# Patient Record
Sex: FEMALE | Race: WHITE | NOT HISPANIC OR LATINO | URBAN - NONMETROPOLITAN AREA
[De-identification: names, ages, dates, MRNs, and addresses within clinical notes are randomized per-mention and may not be internally consistent; named-entity substitution may affect disease eponyms.]

---

## 2018-10-25 PROBLEM — H40.1131: Noted: 2018-10-25

## 2019-10-21 ENCOUNTER — IMPORTED ENCOUNTER (OUTPATIENT)
Dept: URBAN - NONMETROPOLITAN AREA CLINIC 1 | Facility: CLINIC | Age: 79
End: 2019-10-21

## 2019-10-21 PROCEDURE — 92012 INTRM OPH EXAM EST PATIENT: CPT

## 2019-10-21 NOTE — PATIENT DISCUSSION
Alphagan allergy COAG-Recent released by dr. Stacy Hannon at [facility] stable. -Continue Lumigan and Cosopt as directed. Stressed importance of compliance. - Sent refill to [facility] today as pt requested-RTC 3mo TA check OCT ONH & Pach; Dr's Notes: PCP Dr. Saeid Smith in [facility] Pt. Pt wants 80 day Giles Jackson

## 2020-03-21 NOTE — PROCEDURE NOTE: CLINICAL
PROCEDURE NOTE: Avastin 1.25mg #1 OD. Diagnosis: Diabetic Macular Edema. Prior to injection, risks/benefits/alternatives discussed including infection, loss of vision, hemorrhage, cataract, glaucoma, retinal tears or detachment. The off-label status of Intravitreal Avastin also was reviewed. The patient wished to proceed with treatment. Topical anesthesia was induced with Alcaine. Additional anesthesia was achieved using drop(s) or injection checked above. A drop of Povidone-iodine 5% ophthalmic solution was instilled over the injection site and in the inferior fornix. Betadine prep was performed. Using the syringe provided, Avastin 1.25 mg in 0.05 cc was injected into the vitreous cavity. The needle was passed 3.0 mm posterior to the limbus in pseudophakic patients, and 3.5 mm posterior to the limbus in phakic patients. Patient tolerated procedure well. There were no complications. Injection time: 952. Lot #: [email or web address]. Expiration Date: 9788-01-07G33:00:00. Post-op instructions given. Inventory Id: null. The patient was instructed to return for re-evaluation in approximately 4-12 weeks depending on his/her condition and was told to call immediately if vision decreases and/or if his/her eye becomes red, painful, and/or light sensitive. The patient was instructed to go to the emergency room or call 911 if unable to reach the doctor within an hour or two of trying or calling. The patient was instructed to use Artificial Tears q.i.d. p.r.n for comfort. Crista Rico PROCEDURE NOTE: Avastin 1.25mg #1 OS. Diagnosis: Diabetic Macular Edema. Prior to injection, risks/benefits/alternatives discussed including infection, loss of vision, hemorrhage, cataract, glaucoma, retinal tears or detachment. The off-label status of Intravitreal Avastin also was reviewed. The patient wished to proceed with treatment. Topical anesthesia was induced with Alcaine. Additional anesthesia was achieved using drop(s) or injection checked above. A drop of Povidone-iodine 5% ophthalmic solution was instilled over the injection site and in the inferior fornix. Betadine prep was performed. Using the syringe provided, Avastin 1.25 mg in 0.05 cc was injected into the vitreous cavity. The needle was passed 3.0 mm posterior to the limbus in pseudophakic patients, and 3.5 mm posterior to the limbus in phakic patients. Patient tolerated procedure well. There were no complications. Injection time: 951. Lot #: [email or web address]. Expiration Date: 8895-55-51X64:00:00. Post-op instructions given. Inventory Id: null. The patient was instructed to return for re-evaluation in approximately 4-12 weeks depending on his/her condition and was told to call immediately if vision decreases and/or if his/her eye becomes red, painful, and/or light sensitive. The patient was instructed to go to the emergency room or call 911 if unable to reach the doctor within an hour or two of trying or calling. The patient was instructed to use Artificial Tears q.i.d. p.r.n for comfort. Crista Rico

## 2020-04-10 ENCOUNTER — IMPORTED ENCOUNTER (OUTPATIENT)
Dept: URBAN - NONMETROPOLITAN AREA CLINIC 1 | Facility: CLINIC | Age: 80
End: 2020-04-10

## 2020-04-10 PROCEDURE — 92133 CPTRZD OPH DX IMG PST SGM ON: CPT

## 2020-04-10 PROCEDURE — 99213 OFFICE O/P EST LOW 20 MIN: CPT

## 2020-04-10 NOTE — PATIENT DISCUSSION
COAG OU-  Recent released by dr. Batsheva Wolff at [facility]-  discussed findings w/patient today-  IOPs are stable at 22 17-  c/d's .3/.3 and .5/.5-  continue Cosopt BID OU refills sent today-  continue Lumigan QHS OU refills sent today-  OCT ON done today. OD: normal RNFL 84um 9/10 SS; OS: 79um 6/10 SS mild superior thinning all other quadrants normal -  RTC 4 mo f/u w/Gonio and Pach; Dr's Notes:  Allergy to AlphaganPCP Dr. Clifton Cortes in [address] [place] [address] 4/10/2020Pach

## 2020-04-24 NOTE — PROCEDURE NOTE: CLINICAL
PROCEDURE NOTE: Avastin 1.25mg #2 OD. Diagnosis: Diabetic Macular Edema. Anesthesia: Lidocaine 4%. Prep: Betadine Drops. Prior to injection, risks/benefits/alternatives discussed including infection, loss of vision, hemorrhage, cataract, glaucoma, retinal tears or detachment. The off-label status of Intravitreal Avastin also was reviewed. The patient wished to proceed with treatment. Topical anesthesia was induced with Alcaine. Additional anesthesia was achieved using drop(s) or injection checked above. A drop of Povidone-iodine 5% ophthalmic solution was instilled over the injection site and in the inferior fornix. Betadine prep was performed. Using the syringe provided, Avastin 1.25 mg in 0.05 cc was injected into the vitreous cavity. The needle was passed 3.0 mm posterior to the limbus in pseudophakic patients, and 3.5 mm posterior to the limbus in phakic patients. Patient tolerated procedure well. There were no complications. Injection time: 233PM. Lot #: [email or web address]. Expiration Date: 7/15/2020. Post-op instructions given. Inventory Id: null. The patient was instructed to return for re-evaluation in approximately 4-12 weeks depending on his/her condition and was told to call immediately if vision decreases and/or if his/her eye becomes red, painful, and/or light sensitive. The patient was instructed to go to the emergency room or call 911 if unable to reach the doctor within an hour or two of trying or calling. The patient was instructed to use Artificial Tears q.i.d. p.r.n for comfort. Queen Solomon PROCEDURE NOTE: Avastin 1.25mg #2 OS. Diagnosis: Diabetic Macular Edema. Anesthesia: Lidocaine 4%. Prep: Betadine Drops. Prior to injection, risks/benefits/alternatives discussed including infection, loss of vision, hemorrhage, cataract, glaucoma, retinal tears or detachment. The off-label status of Intravitreal Avastin also was reviewed. The patient wished to proceed with treatment. Topical anesthesia was induced with Alcaine. Additional anesthesia was achieved using drop(s) or injection checked above. A drop of Povidone-iodine 5% ophthalmic solution was instilled over the injection site and in the inferior fornix. Betadine prep was performed. Using the syringe provided, Avastin 1.25 mg in 0.05 cc was injected into the vitreous cavity. The needle was passed 3.0 mm posterior to the limbus in pseudophakic patients, and 3.5 mm posterior to the limbus in phakic patients. Patient tolerated procedure well. There were no complications. Injection time: 232PM. Lot #: La [email or web address]. Expiration Date: 7/21/2020. Post-op instructions given. Inventory Id: null. The patient was instructed to return for re-evaluation in approximately 4-12 weeks depending on his/her condition and was told to call immediately if vision decreases and/or if his/her eye becomes red, painful, and/or light sensitive. The patient was instructed to go to the emergency room or call 911 if unable to reach the doctor within an hour or two of trying or calling. The patient was instructed to use Artificial Tears q.i.d. p.r.n for comfort. [facility]

## 2020-06-05 NOTE — PROCEDURE NOTE: CLINICAL
PROCEDURE NOTE: Avastin 1.25mg OD. Diagnosis: Diabetic Macular Edema. Anesthesia: Topical. Prep: Betadine Drops. Prior to injection, risks/benefits/alternatives discussed including infection, loss of vision, hemorrhage, cataract, glaucoma, retinal tears or detachment. The off-label status of Intravitreal Avastin also was reviewed. The patient wished to proceed with treatment. Topical anesthesia was induced with Alcaine. Additional anesthesia was achieved using drop(s) or injection checked above. A drop of Povidone-iodine 5% ophthalmic solution was instilled over the injection site and in the inferior fornix. Betadine prep was performed. Using the syringe provided, Avastin 1.25 mg in 0.05 cc was injected into the vitreous cavity. The needle was passed 3.0 mm posterior to the limbus in pseudophakic patients, and 3.5 mm posterior to the limbus in phakic patients. Patient tolerated procedure well. There were no complications. Injection time: 242PM. Lot #: [email or web address]. Expiration Date: 7/24/2020. Post-op instructions given. Inventory Id: null. The patient was instructed to return for re-evaluation in approximately 4-12 weeks depending on his/her condition and was told to call immediately if vision decreases and/or if his/her eye becomes red, painful, and/or light sensitive. The patient was instructed to go to the emergency room or call 911 if unable to reach the doctor within an hour or two of trying or calling. The patient was instructed to use Artificial Tears q.i.d. p.r.n for comfort. Christos Henry PROCEDURE NOTE: Avastin 1.25mg OS. Diagnosis: Diabetic Macular Edema. Anesthesia: Lidocaine 4%. Prep: Betadine Drops. Prior to injection, risks/benefits/alternatives discussed including infection, loss of vision, hemorrhage, cataract, glaucoma, retinal tears or detachment. The off-label status of Intravitreal Avastin also was reviewed. The patient wished to proceed with treatment. Topical anesthesia was induced with Alcaine. Additional anesthesia was achieved using drop(s) or injection checked above. A drop of Povidone-iodine 5% ophthalmic solution was instilled over the injection site and in the inferior fornix. Betadine prep was performed. Using the syringe provided, Avastin 1.25 mg in 0.05 cc was injected into the vitreous cavity. The needle was passed 3.0 mm posterior to the limbus in pseudophakic patients, and 3.5 mm posterior to the limbus in phakic patients. Patient tolerated procedure well. There were no complications. Injection time: 241PM. Lot #: [email or web address]. Expiration Date: 7/30/2020. Post-op instructions given. Inventory Id: null. The patient was instructed to return for re-evaluation in approximately 4-12 weeks depending on his/her condition and was told to call immediately if vision decreases and/or if his/her eye becomes red, painful, and/or light sensitive. The patient was instructed to go to the emergency room or call 911 if unable to reach the doctor within an hour or two of trying or calling. The patient was instructed to use Artificial Tears q.i.d. p.r.n for comfort. Christos Henyr

## 2020-07-02 ENCOUNTER — IMPORTED ENCOUNTER (OUTPATIENT)
Dept: URBAN - NONMETROPOLITAN AREA CLINIC 1 | Facility: CLINIC | Age: 80
End: 2020-07-02

## 2020-07-02 PROBLEM — H02.88A: Noted: 2020-07-02

## 2020-07-02 PROBLEM — H02.88B: Noted: 2020-07-02

## 2020-07-02 PROBLEM — H26.493: Noted: 2020-07-02

## 2020-07-02 PROBLEM — H40.1131: Noted: 2020-07-02

## 2020-07-02 PROBLEM — H01.021: Noted: 2020-07-02

## 2020-07-02 PROBLEM — H00.021: Noted: 2020-07-02

## 2020-07-02 PROBLEM — H01.024: Noted: 2020-07-02

## 2020-07-02 PROCEDURE — 92012 INTRM OPH EXAM EST PATIENT: CPT

## 2020-07-02 NOTE — PATIENT DISCUSSION
Hordeolum RUL/MGD/Bleph -  discussed findings w/patient-  educated patient this is an inflammitory repsonse to the debris and bateria build up on the lids and lashe-  start hot compresses at least BID-TID w/ocular massage. Discussed Kenyon Mask with patient -  start Tobradex QID OD x 7 days Rx sent-  will have patient start Mak Valdez when samples arrive-  monitor 1 week f/u or prn; Dr's Notes:  Allergy to AlphaganPCP Dr. Hallie Higgins in [address] [place] [address] 4/10/2020Pach

## 2020-07-08 ENCOUNTER — IMPORTED ENCOUNTER (OUTPATIENT)
Dept: URBAN - NONMETROPOLITAN AREA CLINIC 1 | Facility: CLINIC | Age: 80
End: 2020-07-08

## 2020-07-08 PROCEDURE — 99213 OFFICE O/P EST LOW 20 MIN: CPT

## 2020-07-08 NOTE — PATIENT DISCUSSION
Marcia TAPIA (resolving) /MGD/Bleph -  discussed findings w/patient-  continue  hot compresses at least BID-TID w/ocular massage.-Patient states she has purchased and uses Kenyon Mask- Patient to discontinue use of Tobradex - monitor prn- RTC 4 mo f/u as scheduled; Dr's Notes:  Allergy to AlphaganPCP Dr. Deana Rivera in [address] [place] [address] 4/10/2020Pach

## 2020-08-17 ENCOUNTER — IMPORTED ENCOUNTER (OUTPATIENT)
Dept: URBAN - NONMETROPOLITAN AREA CLINIC 1 | Facility: CLINIC | Age: 80
End: 2020-08-17

## 2020-08-17 PROBLEM — H40.1131: Noted: 2020-12-04

## 2020-08-17 PROBLEM — H01.021: Noted: 2020-07-02

## 2020-08-17 PROBLEM — H02.051: Noted: 2021-04-15

## 2020-08-17 PROBLEM — H02.88A: Noted: 2020-12-04

## 2020-08-17 PROBLEM — H01.024: Noted: 2020-07-02

## 2020-08-17 PROBLEM — H02.88B: Noted: 2020-07-02

## 2020-08-17 PROBLEM — H02.88B: Noted: 2020-12-04

## 2020-08-17 PROBLEM — H26.493: Noted: 2020-08-17

## 2020-08-17 PROBLEM — H02.88A: Noted: 2021-04-15

## 2020-08-17 PROBLEM — H40.1131: Noted: 2020-08-17

## 2020-08-17 PROBLEM — H40.1131: Noted: 2021-04-15

## 2020-08-17 PROBLEM — H02.88B: Noted: 2021-04-15

## 2020-08-17 PROBLEM — H02.88A: Noted: 2020-07-02

## 2020-08-17 PROCEDURE — 99213 OFFICE O/P EST LOW 20 MIN: CPT

## 2020-08-17 PROCEDURE — 92020 GONIOSCOPY: CPT

## 2020-08-17 PROCEDURE — 76514 ECHO EXAM OF EYE THICKNESS: CPT

## 2020-08-17 NOTE — PATIENT DISCUSSION
COAG OU-  Recent released by dr. Dafne Azevedo at [facility]-  discussed findings w/patient today-  IOPs are better today at 18 20-  c/d's .3/.3 and .5/.5-  continue Cosopt BID OU-  continue Lumigan QHS OU-  OCT ON done 4/10/2020  OD: normal RNFL 84um 9/10 SS; OS: 79um 6/10 SS mild superior thinning all other quadrants normal -  Pach done today 8/17/[phone]-  Gonio done 8/17/2020 grade 4  w/moderate pigment OU-  RTC as scheduled or prnMGD OU -  discussed findings w/patient-  condition is much worse today -  start hot compresses w/massage for at least 10-15 minutes a day -  start Doxycycline 100mg PO BID -  discussed with patient if there is little to no improvement at next visit will consider BlephEx treatment-  monitor 2 week f/u or prn; Dr's Notes:  Allergy to AlphaganPCP Dr. Tessa Benavides in [facility] Gonio Grade 4 w/mod pigment OUOCT ON 4/10/2020Pach Violet

## 2020-08-21 NOTE — PROCEDURE NOTE: CLINICAL
PROCEDURE NOTE: Avastin 1.25mg OD. Diagnosis: Diabetic Macular Edema. Anesthesia: Lidocaine 4%. Prep: Betadine Drops. Prior to injection, risks/benefits/alternatives discussed including infection, loss of vision, hemorrhage, cataract, glaucoma, retinal tears or detachment. The off-label status of Intravitreal Avastin also was reviewed. The patient wished to proceed with treatment. Topical anesthesia was induced with Alcaine. Additional anesthesia was achieved using drop(s) or injection checked above. A drop of Povidone-iodine 5% ophthalmic solution was instilled over the injection site and in the inferior fornix. Betadine prep was performed. Using the syringe provided, Avastin 1.25 mg in 0.05 cc was injected into the vitreous cavity. The needle was passed 3.0 mm posterior to the limbus in pseudophakic patients, and 3.5 mm posterior to the limbus in phakic patients. Patient tolerated procedure well. There were no complications. Injection time: *. Lot #: [email or web address]. Expiration Date: 11/11/2020. Post-op instructions given. Inventory Id: null. The patient was instructed to return for re-evaluation in approximately 4-12 weeks depending on his/her condition and was told to call immediately if vision decreases and/or if his/her eye becomes red, painful, and/or light sensitive. The patient was instructed to go to the emergency room or call 911 if unable to reach the doctor within an hour or two of trying or calling. The patient was instructed to use Artificial Tears q.i.d. p.r.n for comfort. Neha Canter PROCEDURE NOTE: Avastin 1.25mg OS. Diagnosis: Diabetic Macular Edema. Anesthesia: Topical. Prep: Betadine Drops. Prior to injection, risks/benefits/alternatives discussed including infection, loss of vision, hemorrhage, cataract, glaucoma, retinal tears or detachment. The off-label status of Intravitreal Avastin also was reviewed. The patient wished to proceed with treatment. Topical anesthesia was induced with Alcaine. Additional anesthesia was achieved using drop(s) or injection checked above. A drop of Povidone-iodine 5% ophthalmic solution was instilled over the injection site and in the inferior fornix. Betadine prep was performed. Using the syringe provided, Avastin 1.25 mg in 0.05 cc was injected into the vitreous cavity. The needle was passed 3.0 mm posterior to the limbus in pseudophakic patients, and 3.5 mm posterior to the limbus in phakic patients. Patient tolerated procedure well. There were no complications. Injection time: *. Lot #: [email or web address]. Expiration Date: 11/11/2020. Post-op instructions given. Inventory Id: null. The patient was instructed to return for re-evaluation in approximately 4-12 weeks depending on his/her condition and was told to call immediately if vision decreases and/or if his/her eye becomes red, painful, and/or light sensitive. The patient was instructed to go to the emergency room or call 911 if unable to reach the doctor within an hour or two of trying or calling. The patient was instructed to use Artificial Tears q.i.d. p.r.n for comfort. Neha Craig

## 2020-09-08 ENCOUNTER — IMPORTED ENCOUNTER (OUTPATIENT)
Dept: URBAN - NONMETROPOLITAN AREA CLINIC 1 | Facility: CLINIC | Age: 80
End: 2020-09-08

## 2020-09-08 PROCEDURE — 99213 OFFICE O/P EST LOW 20 MIN: CPT

## 2020-09-08 NOTE — PATIENT DISCUSSION
MGD OU -  discussed findings w/patient-  condition is much worse today -  continue hot compresses w/massage for at least 10-15 minutes a day -  continue Doxycycline 100mg PO BID -  discussed with patient if there is little to no improvement at next visit will consider BlephEx treatment-  monitor as scheduled or prnCOAG OU-  Recent released by dr. Linn Gaucher at [facility]-  discussed findings w/patient today-  IOPs are stable at 23 18-  c/d's .3/.3 and .5/.5-  continue Cosopt BID OU-  continue Lumigan QHS OU-  OCT ON done 4/10/2020      OD: normal RNFL 84um 9/10 SS     OS: 79um 6/10 SS mild superior thinning all other quadrants normal -  Pach done today 8/17/[phone]-  Gonio done 8/17/2020 grade 4  w/moderate pigment OU-  RTC as scheduled or prn; Dr's Notes:  Allergy to AlphaganPCP Dr. Bailey Chauhan in [facility] Gonio Grade 4 w/mod pigment OUOCT ON 4/10/2020Pach Violet

## 2020-09-25 NOTE — PROCEDURE NOTE: CLINICAL
PROCEDURE NOTE: Avastin 1.25mg OD. Diagnosis: Diabetic Macular Edema. Anesthesia: Lidocaine 4%. Prep: Betadine Drops. Prior to injection, risks/benefits/alternatives discussed including infection, loss of vision, hemorrhage, cataract, glaucoma, retinal tears or detachment. The off-label status of Intravitreal Avastin also was reviewed. The patient wished to proceed with treatment. Topical anesthesia was induced with Alcaine. Additional anesthesia was achieved using drop(s) or injection checked above. A drop of Povidone-iodine 5% ophthalmic solution was instilled over the injection site and in the inferior fornix. Betadine prep was performed. Using the syringe provided, Avastin 1.25 mg in 0.05 cc was injected into the vitreous cavity. The needle was passed 3.0 mm posterior to the limbus in pseudophakic patients, and 3.5 mm posterior to the limbus in phakic patients. Patient tolerated procedure well. There were no complications. Injection time: 215P. Lot #: [email or web address]. Expiration Date: 12/24/2020. Post-op instructions given. Inventory Id: null. The patient was instructed to return for re-evaluation in approximately 4-12 weeks depending on his/her condition and was told to call immediately if vision decreases and/or if his/her eye becomes red, painful, and/or light sensitive. The patient was instructed to go to the emergency room or call 911 if unable to reach the doctor within an hour or two of trying or calling. The patient was instructed to use Artificial Tears q.i.d. p.r.n for comfort. Cory Santillan PROCEDURE NOTE: Avastin 1.25mg OS. Diagnosis: Diabetic Macular Edema. Anesthesia: Lidocaine 4%. Prep: Betadine Drops. Prior to injection, risks/benefits/alternatives discussed including infection, loss of vision, hemorrhage, cataract, glaucoma, retinal tears or detachment. The off-label status of Intravitreal Avastin also was reviewed. The patient wished to proceed with treatment. Topical anesthesia was induced with Alcaine. Additional anesthesia was achieved using drop(s) or injection checked above. A drop of Povidone-iodine 5% ophthalmic solution was instilled over the injection site and in the inferior fornix. Betadine prep was performed. Using the syringe provided, Avastin 1.25 mg in 0.05 cc was injected into the vitreous cavity. The needle was passed 3.0 mm posterior to the limbus in pseudophakic patients, and 3.5 mm posterior to the limbus in phakic patients. Patient tolerated procedure well. There were no complications. Injection time: 217P. Lot #: [email or web address]. Expiration Date: 12/23/2020. Post-op instructions given. Inventory Id: null. The patient was instructed to return for re-evaluation in approximately 4-12 weeks depending on his/her condition and was told to call immediately if vision decreases and/or if his/her eye becomes red, painful, and/or light sensitive. The patient was instructed to go to the emergency room or call 911 if unable to reach the doctor within an hour or two of trying or calling. The patient was instructed to use Artificial Tears q.i.d. p.r.n for comfort. Cory Santillan

## 2020-12-07 ENCOUNTER — IMPORTED ENCOUNTER (OUTPATIENT)
Dept: URBAN - NONMETROPOLITAN AREA CLINIC 1 | Facility: CLINIC | Age: 80
End: 2020-12-07

## 2020-12-07 PROCEDURE — 99213 OFFICE O/P EST LOW 20 MIN: CPT

## 2020-12-07 NOTE — PATIENT DISCUSSION
"MGD OU -  discussed findings w/patient-  patient feels that her eyes are doing better at this time-  she self d/c'ed Doxycycine d/t vomiting-  patient has been using ""eye wash'' given to her at [facility] before a surgery and says that her eyes are doing better-  continue hot compresses QD OU-  continue to monitor at  3 mo or prnCOAG OU-  Recent released by Dr. Bull Kapadia at [facility]-  discussed findings w/patient today-  IOPs are stable at 18 OU-  c/d's .3/.3 and .5/.5-  continue Cosopt BID OU-  continue Lumigan QHS OU-  OCT ON done 4/10/2020      OD: normal RNFL 84um 9/10 SS     OS: 79um 6/10 SS mild superior thinning all other quadrants normal -  Pach done 8/17/2020 546 563-  Gonio done 8/17/2020 grade 4  w/moderate pigment OU-  RTC 3 mo f/u w/24-2 VF; Dr's Notes:  Allergy to AlphaganPCP Dr. Stephani Pollock [place] [address] 8/17/2020OCT ON 4/10/2020Pach [facility] VF"

## 2020-12-11 NOTE — PROCEDURE NOTE: CLINICAL
PROCEDURE NOTE: Focal Laser, Retina #1 OD. Diagnosis: Diabetic Macular Edema. Anesthesia: Topical. Prior to focal laser, risks/benefits/alternatives to laser discussed including loss of vision and patient wished to proceed. An informed consent was obtained and no assurances or guarantees were given. Spot size: 100* um. Power: 100* mW. Number of pulses: 28*. Pulse duration:70 * ms. Procedure Time: 235LH*. Patient tolerated procedure well. There were no complications. Post procedure instructions given. Patient given office phone number/answering service number and advised to call immediately should there be loss of vision or pain, or should they have any other questions or concerns. Kassandra Randolph

## 2020-12-18 NOTE — PROCEDURE NOTE: CLINICAL
PROCEDURE NOTE: Focal Laser, Retina OS. Diagnosis: Diabetic Macular Edema. Anesthesia: Topical. Prior to focal laser, risks/benefits/alternatives to laser discussed including loss of vision and patient wished to proceed. An informed consent was obtained and no assurances or guarantees were given. Spot size:100 * um. Power: 60* mW. Number of pulses: 34*. Pulse duration:70 * ms. Procedure Time: 355PM*. Patient tolerated procedure well. There were no complications. Post procedure instructions given. Patient given office phone number/answering service number and advised to call immediately should there be loss of vision or pain, or should they have any other questions or concerns. Pippa Diana

## 2021-02-12 NOTE — PROCEDURE NOTE: CLINICAL
PROCEDURE NOTE: Avastin 1.25mg OD. Diagnosis: Diabetic Macular Edema. Anesthesia: Lidocaine 4%. Prep: Betadine Drops. Prior to injection, risks/benefits/alternatives discussed including infection, loss of vision, hemorrhage, cataract, glaucoma, retinal tears or detachment. The off-label status of Intravitreal Avastin also was reviewed. The patient wished to proceed with treatment. Topical anesthesia was induced with Alcaine. Additional anesthesia was achieved using drop(s) or injection checked above. A drop of Povidone-iodine 5% ophthalmic solution was instilled over the injection site and in the inferior fornix. Betadine prep was performed. Using the syringe provided, Avastin 1.25 mg in 0.05 cc was injected into the vitreous cavity. The needle was passed 3.0 mm posterior to the limbus in pseudophakic patients, and 3.5 mm posterior to the limbus in phakic patients. Patient tolerated procedure well. There were no complications. Injection time: *. Lot #: [email or web address]. Expiration Date: 4/6/2021. Post-op instructions given. Inventory Id: null. The patient was instructed to return for re-evaluation in approximately 4-12 weeks depending on his/her condition and was told to call immediately if vision decreases and/or if his/her eye becomes red, painful, and/or light sensitive. The patient was instructed to go to the emergency room or call 911 if unable to reach the doctor within an hour or two of trying or calling. The patient was instructed to use Artificial Tears q.i.d. p.r.n for comfort. Susi Roblero PROCEDURE NOTE: Avastin 1.25mg OS. Diagnosis: Diabetic Macular Edema. Anesthesia: Lidocaine 4%. Prep: Betadine Drops. Prior to injection, risks/benefits/alternatives discussed including infection, loss of vision, hemorrhage, cataract, glaucoma, retinal tears or detachment. The off-label status of Intravitreal Avastin also was reviewed. The patient wished to proceed with treatment. Topical anesthesia was induced with Alcaine. Additional anesthesia was achieved using drop(s) or injection checked above. A drop of Povidone-iodine 5% ophthalmic solution was instilled over the injection site and in the inferior fornix. Betadine prep was performed. Using the syringe provided, Avastin 1.25 mg in 0.05 cc was injected into the vitreous cavity. The needle was passed 3.0 mm posterior to the limbus in pseudophakic patients, and 3.5 mm posterior to the limbus in phakic patients. Patient tolerated procedure well. There were no complications. Injection time: *. Lot #: [email or web address]. Expiration Date: 4/6/2021. Post-op instructions given. Inventory Id: null. The patient was instructed to return for re-evaluation in approximately 4-12 weeks depending on his/her condition and was told to call immediately if vision decreases and/or if his/her eye becomes red, painful, and/or light sensitive. The patient was instructed to go to the emergency room or call 911 if unable to reach the doctor within an hour or two of trying or calling. The patient was instructed to use Artificial Tears q.i.d. p.r.n for comfort. Susi Roblero

## 2021-03-19 NOTE — PROCEDURE NOTE: CLINICAL
PROCEDURE NOTE: Avastin 1.25mg OD. Diagnosis: Diabetic Macular Edema. Anesthesia: Lidocaine 4%. Prep: Betadine Drops. Prior to injection, risks/benefits/alternatives discussed including infection, loss of vision, hemorrhage, cataract, glaucoma, retinal tears or detachment. The off-label status of Intravitreal Avastin also was reviewed. The patient wished to proceed with treatment. Topical anesthesia was induced with Alcaine. Additional anesthesia was achieved using drop(s) or injection checked above. A drop of Povidone-iodine 5% ophthalmic solution was instilled over the injection site and in the inferior fornix. Betadine prep was performed. Using the syringe provided, Avastin 1.25 mg in 0.05 cc was injected into the vitreous cavity. The needle was passed 3.0 mm posterior to the limbus in pseudophakic patients, and 3.5 mm posterior to the limbus in phakic patients. Patient tolerated procedure well. There were no complications. Injection time: *. Lot #: [email or web address]. Expiration Date: 5/11/2021. Post-op instructions given. Inventory Id: null. The patient was instructed to return for re-evaluation in approximately 4-12 weeks depending on his/her condition and was told to call immediately if vision decreases and/or if his/her eye becomes red, painful, and/or light sensitive. The patient was instructed to go to the emergency room or call 911 if unable to reach the doctor within an hour or two of trying or calling. The patient was instructed to use Artificial Tears q.i.d. p.r.n for comfort. Gaudencio Lott PROCEDURE NOTE: Avastin 1.25mg OS. Diagnosis: Diabetic Macular Edema. Anesthesia: Lidocaine 4%. Prep: Betadine Drops. Prior to injection, risks/benefits/alternatives discussed including infection, loss of vision, hemorrhage, cataract, glaucoma, retinal tears or detachment. The off-label status of Intravitreal Avastin also was reviewed. The patient wished to proceed with treatment. Topical anesthesia was induced with Alcaine. Additional anesthesia was achieved using drop(s) or injection checked above. A drop of Povidone-iodine 5% ophthalmic solution was instilled over the injection site and in the inferior fornix. Betadine prep was performed. Using the syringe provided, Avastin 1.25 mg in 0.05 cc was injected into the vitreous cavity. The needle was passed 3.0 mm posterior to the limbus in pseudophakic patients, and 3.5 mm posterior to the limbus in phakic patients. Patient tolerated procedure well. There were no complications. Injection time: 245PM. Lot #: [email or web address]. Expiration Date: 5/11/2021. Post-op instructions given. Inventory Id: null. The patient was instructed to return for re-evaluation in approximately 4-12 weeks depending on his/her condition and was told to call immediately if vision decreases and/or if his/her eye becomes red, painful, and/or light sensitive. The patient was instructed to go to the emergency room or call 911 if unable to reach the doctor within an hour or two of trying or calling. The patient was instructed to use Artificial Tears q.i.d. p.r.n for comfort. Gaudencio Lott

## 2021-04-15 ENCOUNTER — IMPORTED ENCOUNTER (OUTPATIENT)
Dept: URBAN - NONMETROPOLITAN AREA CLINIC 1 | Facility: CLINIC | Age: 81
End: 2021-04-15

## 2021-04-15 PROCEDURE — 99213 OFFICE O/P EST LOW 20 MIN: CPT

## 2021-04-15 PROCEDURE — 92083 EXTENDED VISUAL FIELD XM: CPT

## 2021-04-15 NOTE — PATIENT DISCUSSION
AMOLG OU-  Recent released by Dr. Smiley Barth at [facility]-  discussed findings w/patient today-  IOPs are stable at 16 18-  c/d's .3/.3 and .5/.5-  continue Cosopt BID OU-  continue Lumigan QHS OU-  24-2 VF done 4/15/2021    OD: UR scattered scotoma non-specific defects central scotoma better than       2015 VF    OS: UR scattered scotoma non-specific defects better than 2015 field-  OCT ON done 4/10/2020      OD: normal RNFL 84um 9/10 SS     OS: 79um 6/10 SS mild superior thinning all other quadrants normal -  Pach done 8/17/2020 546 563-  Gonio done 8/17/2020 grade 4  w/moderate pigment OU-  RTC 3 mo f/u w/MGD OU -  discussed findings w/patient-  patient feels that her eyes are stable at this time-  she stopped doxycycline d/t stomach issues-  continue hot compresses QD OU-  continue to monitor at  3 mo or prnTrichiasis RUL/ JOSE/LLL-  dsicussed findings w/ patient -  2 lashess removed RUL 2 lashes removed from JOSE/ LLL-  eye lash removal well tolerated-  contineu to monitor; Dr's Notes:  [facility]2020OCT ON 4/10/2020Pach 900 [facility] VF 4/15/2021

## 2021-04-20 NOTE — PROCEDURE NOTE: CLINICAL
PROCEDURE NOTE: Avastin 1.25mg OD. Diagnosis: Diabetic Macular Edema. Anesthesia: Lidocaine 4%. Prep: Betadine Drops. Prior to injection, risks/benefits/alternatives discussed including infection, loss of vision, hemorrhage, cataract, glaucoma, retinal tears or detachment. The off-label status of Intravitreal Avastin also was reviewed. The patient wished to proceed with treatment. Topical anesthesia was induced with Alcaine. Additional anesthesia was achieved using drop(s) or injection checked above. A drop of Povidone-iodine 5% ophthalmic solution was instilled over the injection site and in the inferior fornix. Betadine prep was performed. Using the syringe provided, Avastin 1.25 mg in 0.05 cc was injected into the vitreous cavity. The needle was passed 3.0 mm posterior to the limbus in pseudophakic patients, and 3.5 mm posterior to the limbus in phakic patients. Patient tolerated procedure well. There were no complications. Injection time: 2:56PM. [email or web address] [email or web address]. Expiration Date: 9/15/2020. Post-op instructions given. Inventory Id: null. The patient was instructed to return for re-evaluation in approximately 4-12 weeks depending on his/her condition and was told to call immediately if vision decreases and/or if his/her eye becomes red, painful, and/or light sensitive. The patient was instructed to go to the emergency room or call 911 if unable to reach the doctor within an hour or two of trying or calling. The patient was instructed to use Artificial Tears q.i.d. p.r.n for comfort. Gaby Montague PROCEDURE NOTE: Avastin 1.25mg OS. Diagnosis: Diabetic Macular Edema. Anesthesia: Lidocaine 4%. Prep: Betadine Drops. Prior to injection, risks/benefits/alternatives discussed including infection, loss of vision, hemorrhage, cataract, glaucoma, retinal tears or detachment. The off-label status of Intravitreal Avastin also was reviewed. The patient wished to proceed with treatment. Topical anesthesia was induced with Alcaine. Additional anesthesia was achieved using drop(s) or injection checked above. A drop of Povidone-iodine 5% ophthalmic solution was instilled over the injection site and in the inferior fornix. Betadine prep was performed. Using the syringe provided, Avastin 1.25 mg in 0.05 cc was injected into the vitreous cavity. The needle was passed 3.0 mm posterior to the limbus in pseudophakic patients, and 3.5 mm posterior to the limbus in phakic patients. Patient tolerated procedure well. There were no complications. Injection time: 2:56PM. Lot [email or web address]: [id number]. Expiration Date: 9/2/2020. Post-op instructions given. Inventory Id: null. The patient was instructed to return for re-evaluation in approximately 4-12 weeks depending on his/her condition and was told to call immediately if vision decreases and/or if his/her eye becomes red, painful, and/or light sensitive. The patient was instructed to go to the emergency room or call 911 if unable to reach the doctor within an hour or two of trying or calling. The patient was instructed to use Artificial Tears q.i.d. p.r.n for comfort. Gaby Montague
Yes

## 2021-05-04 NOTE — PROCEDURE NOTE: CLINICAL
PROCEDURE NOTE: Avastin 1.25mg OD. Diagnosis: Diabetic Macular Edema. Anesthesia: Lidocaine 4%. Prep: Betadine Drops. Prior to injection, risks/benefits/alternatives discussed including infection, loss of vision, hemorrhage, cataract, glaucoma, retinal tears or detachment. The off-label status of Intravitreal Avastin also was reviewed. The patient wished to proceed with treatment. Topical anesthesia was induced with Alcaine. Additional anesthesia was achieved using drop(s) or injection checked above. A drop of Povidone-iodine 5% ophthalmic solution was instilled over the injection site and in the inferior fornix. Betadine prep was performed. Using the syringe provided, Avastin 1.25 mg in 0.05 cc was injected into the vitreous cavity. The needle was passed 3.0 mm posterior to the limbus in pseudophakic patients, and 3.5 mm posterior to the limbus in phakic patients. Patient tolerated procedure well. There were no complications. Injection time: *. Lot #: [id number]. Expiration Date: 6/24/2021. Post-op instructions given. Inventory Id: null. The patient was instructed to return for re-evaluation in approximately 4-12 weeks depending on his/her condition and was told to call immediately if vision decreases and/or if his/her eye becomes red, painful, and/or light sensitive. The patient was instructed to go to the emergency room or call 911 if unable to reach the doctor within an hour or two of trying or calling. The patient was instructed to use Artificial Tears q.i.d. p.r.n for comfort. Gaudencio Lott PROCEDURE NOTE: Avastin 1.25mg OS. Diagnosis: Diabetic Macular Edema. Anesthesia: Lidocaine 4%. Prep: Betadine Drops. Prior to injection, risks/benefits/alternatives discussed including infection, loss of vision, hemorrhage, cataract, glaucoma, retinal tears or detachment. The off-label status of Intravitreal Avastin also was reviewed. The patient wished to proceed with treatment. Topical anesthesia was induced with Alcaine. Additional anesthesia was achieved using drop(s) or injection checked above. A drop of Povidone-iodine 5% ophthalmic solution was instilled over the injection site and in the inferior fornix. Betadine prep was performed. Using the syringe provided, Avastin 1.25 mg in 0.05 cc was injected into the vitreous cavity. The needle was passed 3.0 mm posterior to the limbus in pseudophakic patients, and 3.5 mm posterior to the limbus in phakic patients. Patient tolerated procedure well. There were no complications. Injection time: *. Lot #: [email or web address]. Expiration Date: 7/1/2021. Post-op instructions given. Inventory Id: null. The patient was instructed to return for re-evaluation in approximately 4-12 weeks depending on his/her condition and was told to call immediately if vision decreases and/or if his/her eye becomes red, painful, and/or light sensitive. The patient was instructed to go to the emergency room or call 911 if unable to reach the doctor within an hour or two of trying or calling. The patient was instructed to use Artificial Tears q.i.d. p.r.n for comfort. Gaudencio Lott

## 2021-06-01 ENCOUNTER — IMPORTED ENCOUNTER (OUTPATIENT)
Dept: URBAN - NONMETROPOLITAN AREA CLINIC 1 | Facility: CLINIC | Age: 81
End: 2021-06-01

## 2021-06-01 PROBLEM — H01.021: Noted: 2021-06-01

## 2021-06-01 PROBLEM — H26.493: Noted: 2021-06-01

## 2021-06-01 PROBLEM — H40.1131: Noted: 2021-06-01

## 2021-06-01 PROBLEM — H00.024: Noted: 2021-06-01

## 2021-06-01 PROBLEM — H01.024: Noted: 2021-06-01

## 2021-06-01 PROBLEM — H02.88A: Noted: 2021-06-01

## 2021-06-01 PROBLEM — H02.88B: Noted: 2021-06-01

## 2021-06-01 PROCEDURE — 99213 OFFICE O/P EST LOW 20 MIN: CPT

## 2021-06-01 NOTE — PATIENT DISCUSSION
Marcia HAL x 3 -  discussed findings w/patient-  start vigorous hot compresses x TID-QID -  start Keflex 500mg BID PO x 10 days-  start Maxitrol kelle QID OS -  RTC 1 week f/u or prn; Dr's Notes:  Allergy to AlphaganPCP Dr. Zhen Garcia in [facility] 430 8/17/2020OCT ON 4/10/2020Pach 900 [facility] 4/15/2021

## 2021-06-09 ENCOUNTER — IMPORTED ENCOUNTER (OUTPATIENT)
Dept: URBAN - NONMETROPOLITAN AREA CLINIC 1 | Facility: CLINIC | Age: 81
End: 2021-06-09

## 2021-06-09 PROCEDURE — 99213 OFFICE O/P EST LOW 20 MIN: CPT

## 2021-06-09 NOTE — PATIENT DISCUSSION
Marcia JOSE x 3 -  discussed findings w/patient-  continue  vigorous hot compresses x TID-QID -  finish Keflex 500mg BID PO x 10 days-  continue Maxitrol kelle BID OS x 1 week then d/c-  RTC 1 mo f/u or prn; Dr's Notes:  Allergy to AlphaganPCP Dr. Rick Angel in [facility] 430 8/17/2020OCT ON 4/10/2020Pach 900 [facility] 4/15/2021

## 2021-06-15 NOTE — PROCEDURE NOTE: CLINICAL
PROCEDURE NOTE: Avastin 1.25mg OD. Diagnosis: Diabetic Macular Edema. Anesthesia: Lidocaine 4%. Prep: Betadine Drops. Prior to injection, risks/benefits/alternatives discussed including infection, loss of vision, hemorrhage, cataract, glaucoma, retinal tears or detachment. The off-label status of Intravitreal Avastin also was reviewed. The patient wished to proceed with treatment. Topical anesthesia was induced with Alcaine. Additional anesthesia was achieved using drop(s) or injection checked above. A drop of Povidone-iodine 5% ophthalmic solution was instilled over the injection site and in the inferior fornix. Betadine prep was performed. Using the syringe provided, Avastin 1.25 mg in 0.05 cc was injected into the vitreous cavity. The needle was passed 3.0 mm posterior to the limbus in pseudophakic patients, and 3.5 mm posterior to the limbus in phakic patients. Patient tolerated procedure well. There were no complications. Injection time: 2:40 PM. Lot #: [id number]. Expiration Date: 8/10/2021. Post-op instructions given. Inventory Id: null. The patient was instructed to return for re-evaluation in approximately 4-12 weeks depending on his/her condition and was told to call immediately if vision decreases and/or if his/her eye becomes red, painful, and/or light sensitive. The patient was instructed to go to the emergency room or call 911 if unable to reach the doctor within an hour or two of trying or calling. The patient was instructed to use Artificial Tears q.i.d. p.r.n for comfort. Coleen Sharp PROCEDURE NOTE: Avastin 1.25mg OS. Diagnosis: Diabetic Macular Edema. Anesthesia: Lidocaine 4%. Prep: Betadine Drops. Prior to injection, risks/benefits/alternatives discussed including infection, loss of vision, hemorrhage, cataract, glaucoma, retinal tears or detachment. The off-label status of Intravitreal Avastin also was reviewed. The patient wished to proceed with treatment. Topical anesthesia was induced with Alcaine. Additional anesthesia was achieved using drop(s) or injection checked above. A drop of Povidone-iodine 5% ophthalmic solution was instilled over the injection site and in the inferior fornix. Betadine prep was performed. Using the syringe provided, Avastin 1.25 mg in 0.05 cc was injected into the vitreous cavity. The needle was passed 3.0 mm posterior to the limbus in pseudophakic patients, and 3.5 mm posterior to the limbus in phakic patients. Patient tolerated procedure well. There were no complications. Injection time: 2:40 PM. Lot #: [id number]. Expiration Date: 8/12/2021. Post-op instructions given. Inventory Id: null. The patient was instructed to return for re-evaluation in approximately 4-12 weeks depending on his/her condition and was told to call immediately if vision decreases and/or if his/her eye becomes red, painful, and/or light sensitive. The patient was instructed to go to the emergency room or call 911 if unable to reach the doctor within an hour or two of trying or calling. The patient was instructed to use Artificial Tears q.i.d. p.r.n for comfort. Coleen Sharp

## 2021-06-16 ENCOUNTER — IMPORTED ENCOUNTER (OUTPATIENT)
Dept: URBAN - NONMETROPOLITAN AREA CLINIC 1 | Facility: CLINIC | Age: 81
End: 2021-06-16

## 2021-06-16 PROCEDURE — 99213 OFFICE O/P EST LOW 20 MIN: CPT

## 2021-06-16 NOTE — PATIENT DISCUSSION
Hordeolum JOSE x 3 Capped Gland RLL-  discussed findings w/patient-  continue  vigorous hot compresses x TID-QID -  start Doxycycline 100mg BID PO-  continue Maxitrol kelle BID OS x 1 week then d/c-  RTC 1 week or prn; Dr's Notes:  Allergy to AlphaganPCP Dr. Deana Rivera in [facility] 430 8/17/2020OCT ON 4/10/2020Pach 900 [facility] 4/15/2021

## 2021-06-25 ENCOUNTER — IMPORTED ENCOUNTER (OUTPATIENT)
Dept: URBAN - NONMETROPOLITAN AREA CLINIC 1 | Facility: CLINIC | Age: 81
End: 2021-06-25

## 2021-06-25 PROCEDURE — 99213 OFFICE O/P EST LOW 20 MIN: CPT

## 2021-06-25 NOTE — PATIENT DISCUSSION
Hordeolum JOSE x 3 Capped Gland RLL Hordeolum RLL-  discussed findings w/patient-  continue vigorous hot compresses x TID-QID -  continue Doxycycline 100mg BID PO-  refer to Dr. Fabby Goodrich at [facility] for possible IPL-  continue to monitor as per Dr. Fabby Goodrich; 's Notes:  Allergy to AlphaganPCP Dr. Kim Ayers in [facility] 430 8/17/2020OCT ON 4/10/2020Pach 900 [facility] 4/15/2021

## 2021-08-19 NOTE — PROCEDURE NOTE: CLINICAL
PROCEDURE NOTE: Avastin 1.25mg OD. Diagnosis: Diabetic Macular Edema. Anesthesia: Lidocaine 4%. Prep: Betadine Drops. Prior to injection, risks/benefits/alternatives discussed including infection, loss of vision, hemorrhage, cataract, glaucoma, retinal tears or detachment. The off-label status of Intravitreal Avastin also was reviewed. The patient wished to proceed with treatment. Topical anesthesia was induced with Alcaine. Additional anesthesia was achieved using drop(s) or injection checked above. A drop of Povidone-iodine 5% ophthalmic solution was instilled over the injection site and in the inferior fornix. Betadine prep was performed. Using the syringe provided, Avastin 1.25 mg in 0.05 cc was injected into the vitreous cavity. The needle was passed 3.0 mm posterior to the limbus in pseudophakic patients, and 3.5 mm posterior to the limbus in phakic patients. Patient tolerated procedure well. There were no complications. Injection time: 08:55AM. Lot #: [id number]. Expiration Date: 10/1/2021. Post-op instructions given. Inventory Id: null. The patient was instructed to return for re-evaluation in approximately 4-12 weeks depending on his/her condition and was told to call immediately if vision decreases and/or if his/her eye becomes red, painful, and/or light sensitive. The patient was instructed to go to the emergency room or call 911 if unable to reach the doctor within an hour or two of trying or calling. The patient was instructed to use Artificial Tears q.i.d. p.r.n for comfort. Pennye Hand PROCEDURE NOTE: Avastin 1.25mg OS. Diagnosis: Diabetic Macular Edema. Anesthesia: Lidocaine 4%. Prep: Betadine Drops. Prior to injection, risks/benefits/alternatives discussed including infection, loss of vision, hemorrhage, cataract, glaucoma, retinal tears or detachment. The off-label status of Intravitreal Avastin also was reviewed. The patient wished to proceed with treatment. Topical anesthesia was induced with Alcaine. Additional anesthesia was achieved using drop(s) or injection checked above. A drop of Povidone-iodine 5% ophthalmic solution was instilled over the injection site and in the inferior fornix. Betadine prep was performed. Using the syringe provided, Avastin 1.25 mg in 0.05 cc was injected into the vitreous cavity. The needle was passed 3.0 mm posterior to the limbus in pseudophakic patients, and 3.5 mm posterior to the limbus in phakic patients. Patient tolerated procedure well. There were no complications. Injection time: 08:55AM. Lot #: [id number]. Expiration Date: 10/1/2021. Post-op instructions given. Inventory Id: null. The patient was instructed to return for re-evaluation in approximately 4-12 weeks depending on his/her condition and was told to call immediately if vision decreases and/or if his/her eye becomes red, painful, and/or light sensitive. The patient was instructed to go to the emergency room or call 911 if unable to reach the doctor within an hour or two of trying or calling. The patient was instructed to use Artificial Tears q.i.d. p.r.n for comfort. Pennye Hand

## 2021-09-23 ENCOUNTER — IMPORTED ENCOUNTER (OUTPATIENT)
Dept: URBAN - NONMETROPOLITAN AREA CLINIC 1 | Facility: CLINIC | Age: 81
End: 2021-09-23

## 2021-09-23 PROBLEM — H02.88A: Noted: 2021-09-23

## 2021-09-23 PROBLEM — H02.88B: Noted: 2021-09-23

## 2021-09-23 PROBLEM — H26.493: Noted: 2021-09-23

## 2021-09-23 PROBLEM — H01.024: Noted: 2021-09-23

## 2021-09-23 PROBLEM — H40.1131: Noted: 2021-09-23

## 2021-09-23 PROBLEM — H01.021: Noted: 2021-09-23

## 2021-09-23 PROCEDURE — 99213 OFFICE O/P EST LOW 20 MIN: CPT

## 2021-09-23 PROCEDURE — 92020 GONIOSCOPY: CPT

## 2021-09-23 NOTE — PATIENT DISCUSSION
COAG OU-  Recent released by Dr. Bre Franklin at [facility]-  discussed findings w/patient today-  IOPs are stable at 23-  c/d's .3/.3 and .5/.5-  continue Cosopt BID OU-  continue Lumigan QHS OU-  24-2 VF done 4/15/2021    OD: UR scattered scotoma non-specific defects central scotoma better     than 2015 VF    OS: UR scattered scotoma non-specific defects better than 2015 field-  OCT ON done 4/10/2020      OD: normal RNFL 84um 9/10 SS     OS: 79um 6/10 SS mild superior thinning all other quadrants normal -  Pach done 8/17/2020 546 563-  Gonio done 9/23/2021 grade 4  w/moderate pigment OU-  RTC 3 mo f/u w/POAG w/24-2 VFMGD OU -  discussed findings w/patient-  patient feels that her eyes are stable at this time-  she stopped doxycycline d/t stomach issues-  continue hot compresses QD OU-  continue to monitor at  3 mo or prnTrichiasis RUL/ JOSE/LLL-  dsicussed findings w/ patient -  2 lashess removed RUL 2 lashes removed from JOSE/ LLL-  eye lash removal well tolerated-  contineu to monitor; Dr's Notes:  Allergy to [facility] Dr[facility] Eve [facility] [address] 9/23/2021OCT ON 4/10/2020Pach 900 [facility] VF 4/15/2021

## 2021-09-24 NOTE — PATIENT DISCUSSION
9/24/21 SWITCH TO EYLEA 2 MG TODAY TO SEE IF CAN PROLONG TX, THEN EYLEA 2 MG WITHIN 2 MONTHS INJ AND PROLONG TIMES 2-5 DOI.

## 2021-09-24 NOTE — PROCEDURE NOTE: CLINICAL

## 2021-10-29 NOTE — PROCEDURE NOTE: CLINICAL

## 2021-12-03 NOTE — PROCEDURE NOTE: CLINICAL

## 2021-12-22 ENCOUNTER — IMPORTED ENCOUNTER (OUTPATIENT)
Dept: URBAN - NONMETROPOLITAN AREA CLINIC 1 | Facility: CLINIC | Age: 81
End: 2021-12-22

## 2021-12-22 ENCOUNTER — PREPPED CHART (OUTPATIENT)
Dept: URBAN - NONMETROPOLITAN AREA CLINIC 4 | Facility: CLINIC | Age: 81
End: 2021-12-22

## 2021-12-22 PROCEDURE — 99213 OFFICE O/P EST LOW 20 MIN: CPT

## 2021-12-22 PROCEDURE — 92083 EXTENDED VISUAL FIELD XM: CPT

## 2021-12-22 NOTE — PATIENT DISCUSSION
AUDREY OU-  discussed findings w/patient today-  patient previously saw Dr. Glenis Fernandez at [facility] but was released from his care-  IOPs 25 18-  c/d's .3/.3 and .5/.5-  continue Cosopt BID OU-  continue Lumigan QHS OU-  24-2 VF done 12/22/2021    OD: R scattered scotoma non-specific defects stable    OS: R. scattered scotoma non-specific defects stable    OD: normal RNFL 84um 9/10 SS     OS: 79um 6/10 SS mild superior thinning all other quadrants normal -  Pach done 8/17/2020 [id number]  Gonio done 9/23/2021 grade 4  w/moderate pigment OU-  RTC 3 mo f/u w/POAG w/OCT ONMGD OU/External Hordeolum RUL-  discussed findings w/patient-  start hot compresses w/ocular massage-  she stopped doxycycline d/t stomach issues-  continue hot compresses QD OU-  continue to monitor at  3 mo or prnTrichiasis RUL/ JOSE/LLL stable-  discussed findings w/ patient -  no lashes removed at this time-  continue to monitor as scheduled or prn; Dr's Notes:  Allergy to [facility] Dr. Roxane Tal[place] [address] 9/23/2021OCT ON 4/10/2020[address] [facility] VF 12/22/2021

## 2022-01-25 NOTE — PROCEDURE NOTE: CLINICAL

## 2022-03-21 ASSESSMENT — VISUAL ACUITY
OD_SC: 20/25+1
OS_PH: 20/40
OS_SC: 20/50

## 2022-03-22 NOTE — PROCEDURE NOTE: CLINICAL

## 2022-03-24 ENCOUNTER — FOLLOW UP (OUTPATIENT)
Dept: URBAN - NONMETROPOLITAN AREA CLINIC 4 | Facility: CLINIC | Age: 82
End: 2022-03-24

## 2022-03-24 DIAGNOSIS — H40.1131: ICD-10-CM

## 2022-03-24 PROCEDURE — 92133 CPTRZD OPH DX IMG PST SGM ON: CPT

## 2022-03-24 PROCEDURE — 99213 OFFICE O/P EST LOW 20 MIN: CPT

## 2022-03-24 ASSESSMENT — VISUAL ACUITY
OS_SC: 20/80
OD_SC: 20/25
OS_PH: 20/60

## 2022-03-24 ASSESSMENT — TONOMETRY
OD_IOP_MMHG: 17
OS_IOP_MMHG: 18

## 2022-03-24 NOTE — PROCEDURE NOTE: CLINICAL
PROCEDURE NOTE: Epilation #3 Left Upper Lid. Diagnosis: Squamous Blepharitis. Anesthesia: Topical. Prior to treatment, the risks/benefits/alternatives were discussed. The patient wished to proceed with procedure. Aberrant lashes removed from * using microforceps. Patient tolerated procedure well. There were no complications. Post-op instructions given. Tian Lamar

## 2022-04-10 ASSESSMENT — PACHYMETRY
OD_CT_UM: 546; ADJ: THIN
OS_CT_UM: 563; ADJ: WNL
OD_CT_UM: 546; ADJ: THIN
OS_CT_UM: 563; ADJ: WNL
OS_CT_UM: 563; ADJ: WNL
OD_CT_UM: 546; ADJ: THIN
OS_CT_UM: 563; ADJ: WNL
OD_CT_UM: 546; ADJ: THIN
OD_CT_UM: 546; ADJ: THIN
OS_CT_UM: 563; ADJ: WNL
OS_CT_UM: 563; ADJ: WNL

## 2022-04-10 ASSESSMENT — VISUAL ACUITY
OU_SC: 20/20-2
OU_CC: 20/20
OU_SC: 20/30
OD_CC: 20/25
OD_CC: 20/20-1
OU_CC: 20/25
OS_CC: 20/50-2
OS_PH: 20/40
OD_CC: 20/200
OS_CC: 20/50+
OS_CC: 20/50-
OS_PH: 20/40
OS_CC: 20/70
OS_SC: 20/30
OU_CC: 20/30+2
OS_PH: 20/40
OD_CC: 20/30
OU_CC: 20/25
OD_CC: 20/30
OS_CC: 20/60-1
OS_PH: 20/30
OD_CC: 20/25-2
OU_CC: 20/30
OU_CC: 20/30
OS_CC: 20/50
OS_PH: 20/50
OD_SC: 20/30
OS_CC: 20/50
OD_CC: 20/25+1
OD_CC: 20/25+
OU_CC: 20/20
OD_CC: 20/25-1
OU_CC: 20/20
OS_CC: 20/80
OD_CC: 20/20-
OS_CC: 20/70
OS_CC: 20/30-2
OS_CC: 20/60
OU_CC: 20/20-1
OS_CC: 20/40
OS_CC: 20/30-1
OD_CC: 20/25-2
OD_CC: 20/20
OD_CC: 20/25

## 2022-04-10 ASSESSMENT — TONOMETRY
OD_IOP_MMHG: 22
OS_IOP_MMHG: 18
OD_IOP_MMHG: 17
OS_IOP_MMHG: 18
OS_IOP_MMHG: 17
OS_IOP_MMHG: 17
OD_IOP_MMHG: 18
OD_IOP_MMHG: 18
OS_IOP_MMHG: 16
OS_IOP_MMHG: 19
OS_IOP_MMHG: 17
OD_IOP_MMHG: 17
OD_IOP_MMHG: 20
OD_IOP_MMHG: 19
OD_IOP_MMHG: 18
OS_IOP_MMHG: 18
OD_IOP_MMHG: 17
OS_IOP_MMHG: 18
OS_IOP_MMHG: 20
OS_IOP_MMHG: 18
OD_IOP_MMHG: 16
OD_IOP_MMHG: 19
OD_IOP_MMHG: 18
OS_IOP_MMHG: 18
OD_IOP_MMHG: 21
OS_IOP_MMHG: 18

## 2022-05-20 NOTE — PROCEDURE NOTE: CLINICAL

## 2022-07-13 NOTE — PATIENT DISCUSSION
7 13 22 TRACE EDEMA 8 WKS - EYLEA AND REDUCE TO 6 TO TRY AND CLEAR RESIDUAL EDEMA OD AND FOR EDEMA OS.

## 2022-07-13 NOTE — PROCEDURE NOTE: CLINICAL

## 2022-07-28 ENCOUNTER — FOLLOW UP (OUTPATIENT)
Dept: RURAL CLINIC 1 | Facility: CLINIC | Age: 82
End: 2022-07-28

## 2022-07-28 DIAGNOSIS — H02.88A: ICD-10-CM

## 2022-07-28 DIAGNOSIS — H02.054: ICD-10-CM

## 2022-07-28 DIAGNOSIS — H40.1131: ICD-10-CM

## 2022-07-28 PROCEDURE — 99213 OFFICE O/P EST LOW 20 MIN: CPT

## 2022-07-28 ASSESSMENT — VISUAL ACUITY
OU_SC: 20/30
OU_SC: 20/20-1
OD_SC: 20/20-1
OS_SC: 20/80-2

## 2022-07-28 ASSESSMENT — TONOMETRY
OS_IOP_MMHG: 21
OD_IOP_MMHG: 24

## 2022-08-24 NOTE — PROCEDURE NOTE: CLINICAL

## 2022-10-07 NOTE — PROCEDURE NOTE: CLINICAL

## 2022-11-18 NOTE — PATIENT DISCUSSION
Discussed the importance of blood sugar and blood pressure control. What Type Of Note Output Would You Prefer (Optional)?: Standard Output Hpi Title: Evaluation of Skin Lesions How Severe Are Your Spot(S)?: moderate Have Your Spot(S) Been Treated In The Past?: has not been treated DISPLAY PLAN FREE TEXT

## 2022-11-18 NOTE — PROCEDURE NOTE: CLINICAL

## 2022-11-18 NOTE — PATIENT DISCUSSION
No retinal holes or tears seen on exam. Recommended OBSERVATION. We reviewed the signs and symptoms of retinal tear/retinal detachment and the importance of prompt evaluation should there be increasing floaters, new flashing lights, or decreasing peripheral vision in either eye at any time. Patient understands condition, prognosis and need for follow up care. Onset: \"Approximately 7 days.\"      Location / description: patient reporting cold-like symptoms for over a week. Intermittent SOB, but relieved with inhaler. States that this is her baseline. Denies difficulty breathing.   Precipitating Factors: HX-Type 2 DM, Asthma with COPD, etc--see problem list   Pain Scale (1-10), 10 highest: variable   Associated Symptoms: productive cough mainly at night, congestion, denies fever. Chest tightness, but only while coughing.     What improves/worsens symptoms: Inhaler and OTC Cough/cold medicine  Symptom specific medications: Inhaler PRN  LMP : No LMP recorded. Patient is postmenopausal.   Are you pregnant or breast feeding:  N/A  Recent visits (last 3-4 weeks) for same reason or recent surgery:  NO    PLAN:    Home Care Advice provided     Patient encouraged to be seen if symptoms not improving with care advice given. States that she has been taking COVID-19 precautions by wearing mask and practicing safe hand washing.    Patient/Caller agrees to follow recommendations.    Reason for Disposition  • Cough with cold symptoms (e.g., runny nose, postnasal drip, throat clearing)    Protocols used: COUGH - ACUTE [id number]

## 2022-12-01 ENCOUNTER — FOLLOW UP (OUTPATIENT)
Dept: RURAL CLINIC 1 | Facility: CLINIC | Age: 82
End: 2022-12-01

## 2022-12-01 PROCEDURE — 99214 OFFICE O/P EST MOD 30 MIN: CPT

## 2022-12-01 PROCEDURE — 92083 EXTENDED VISUAL FIELD XM: CPT

## 2022-12-01 ASSESSMENT — TONOMETRY
OD_IOP_MMHG: 23
OS_IOP_MMHG: 20

## 2022-12-01 ASSESSMENT — VISUAL ACUITY
OU_SC: 20/30-1
OD_SC: 20/30+1
OS_SC: 20/200

## 2023-01-06 NOTE — PROCEDURE NOTE: CLINICAL

## 2023-05-09 ENCOUNTER — FOLLOW UP (OUTPATIENT)
Dept: RURAL CLINIC 1 | Facility: CLINIC | Age: 83
End: 2023-05-09

## 2023-05-09 DIAGNOSIS — H02.054: ICD-10-CM

## 2023-05-09 DIAGNOSIS — Z96.1: ICD-10-CM

## 2023-05-09 DIAGNOSIS — H02.88A: ICD-10-CM

## 2023-05-09 DIAGNOSIS — H00.14: ICD-10-CM

## 2023-05-09 DIAGNOSIS — H40.1131: ICD-10-CM

## 2023-05-09 DIAGNOSIS — H02.88B: ICD-10-CM

## 2023-05-09 DIAGNOSIS — H26.493: ICD-10-CM

## 2023-05-09 PROCEDURE — 92014 COMPRE OPH EXAM EST PT 1/>: CPT

## 2023-05-09 PROCEDURE — 92133 CPTRZD OPH DX IMG PST SGM ON: CPT

## 2023-05-09 ASSESSMENT — TONOMETRY
OS_IOP_MMHG: 22
OD_IOP_MMHG: 25

## 2023-05-09 ASSESSMENT — VISUAL ACUITY
OD_SC: 20/30+2
OS_SC: 20/200

## 2023-09-08 ENCOUNTER — FOLLOW UP (OUTPATIENT)
Dept: URBAN - NONMETROPOLITAN AREA CLINIC 4 | Facility: CLINIC | Age: 83
End: 2023-09-08

## 2023-09-08 DIAGNOSIS — H02.88A: ICD-10-CM

## 2023-09-08 DIAGNOSIS — Z96.1: ICD-10-CM

## 2023-09-08 DIAGNOSIS — H02.88B: ICD-10-CM

## 2023-09-08 DIAGNOSIS — H00.14: ICD-10-CM

## 2023-09-08 DIAGNOSIS — H26.493: ICD-10-CM

## 2023-09-08 DIAGNOSIS — H40.1131: ICD-10-CM

## 2023-09-08 PROCEDURE — 99213 OFFICE O/P EST LOW 20 MIN: CPT

## 2023-09-08 PROCEDURE — 92083 EXTENDED VISUAL FIELD XM: CPT

## 2023-09-08 PROCEDURE — 92020 GONIOSCOPY: CPT

## 2023-09-08 ASSESSMENT — TONOMETRY
OD_IOP_MMHG: 22
OS_IOP_MMHG: 20

## 2023-09-08 ASSESSMENT — VISUAL ACUITY
OS_SC: 20/200
OD_SC: 20/25-1

## 2023-12-08 ENCOUNTER — ESTABLISHED PATIENT (OUTPATIENT)
Dept: URBAN - NONMETROPOLITAN AREA CLINIC 4 | Facility: CLINIC | Age: 83
End: 2023-12-08

## 2023-12-08 DIAGNOSIS — H00.14: ICD-10-CM

## 2023-12-08 DIAGNOSIS — H02.88B: ICD-10-CM

## 2023-12-08 DIAGNOSIS — H02.88A: ICD-10-CM

## 2023-12-08 DIAGNOSIS — Z96.1: ICD-10-CM

## 2023-12-08 DIAGNOSIS — H26.493: ICD-10-CM

## 2023-12-08 DIAGNOSIS — H40.1131: ICD-10-CM

## 2023-12-08 PROCEDURE — 92133 CPTRZD OPH DX IMG PST SGM ON: CPT

## 2023-12-08 PROCEDURE — 99213 OFFICE O/P EST LOW 20 MIN: CPT

## 2023-12-08 ASSESSMENT — VISUAL ACUITY
OS_SC: 20/200+1
OD_SC: 20/25-1

## 2023-12-08 ASSESSMENT — TONOMETRY
OS_IOP_MMHG: 17
OD_IOP_MMHG: 16

## 2024-04-08 ENCOUNTER — ESTABLISHED PATIENT (OUTPATIENT)
Dept: URBAN - NONMETROPOLITAN AREA CLINIC 4 | Facility: CLINIC | Age: 84
End: 2024-04-08

## 2024-04-08 DIAGNOSIS — H20.00: ICD-10-CM

## 2024-04-08 DIAGNOSIS — H26.493: ICD-10-CM

## 2024-04-08 DIAGNOSIS — H40.1131: ICD-10-CM

## 2024-04-08 DIAGNOSIS — Z98.890: ICD-10-CM

## 2024-04-08 DIAGNOSIS — H02.88A: ICD-10-CM

## 2024-04-08 DIAGNOSIS — H02.88B: ICD-10-CM

## 2024-04-08 DIAGNOSIS — H35.81: ICD-10-CM

## 2024-04-08 DIAGNOSIS — Z96.1: ICD-10-CM

## 2024-04-08 PROCEDURE — 99213 OFFICE O/P EST LOW 20 MIN: CPT

## 2024-04-08 PROCEDURE — 92134 CPTRZ OPH DX IMG PST SGM RTA: CPT

## 2024-04-08 ASSESSMENT — VISUAL ACUITY
OS_SC: CF 6FT
OD_SC: 20/25

## 2024-04-08 ASSESSMENT — TONOMETRY
OD_IOP_MMHG: 18
OS_IOP_MMHG: 20

## 2024-08-26 ENCOUNTER — COMPREHENSIVE EXAM (OUTPATIENT)
Dept: URBAN - NONMETROPOLITAN AREA CLINIC 4 | Facility: CLINIC | Age: 84
End: 2024-08-26

## 2024-08-26 DIAGNOSIS — H35.81: ICD-10-CM

## 2024-08-26 DIAGNOSIS — H20.00: ICD-10-CM

## 2024-08-26 DIAGNOSIS — H26.493: ICD-10-CM

## 2024-08-26 DIAGNOSIS — Z96.1: ICD-10-CM

## 2024-08-26 DIAGNOSIS — H02.88B: ICD-10-CM

## 2024-08-26 DIAGNOSIS — Z98.890: ICD-10-CM

## 2024-08-26 DIAGNOSIS — H40.1131: ICD-10-CM

## 2024-08-26 DIAGNOSIS — H02.88A: ICD-10-CM

## 2024-08-26 PROCEDURE — 92012 INTRM OPH EXAM EST PATIENT: CPT

## 2024-08-26 ASSESSMENT — VISUAL ACUITY
OD_SC: 20/25
OS_SC: 20/200

## 2024-08-26 ASSESSMENT — TONOMETRY
OS_IOP_MMHG: 20
OD_IOP_MMHG: 18

## 2024-12-12 ENCOUNTER — COMPREHENSIVE EXAM (OUTPATIENT)
Age: 84
End: 2024-12-12

## 2024-12-12 DIAGNOSIS — H40.1112: ICD-10-CM

## 2024-12-12 DIAGNOSIS — H40.1123: ICD-10-CM

## 2024-12-12 DIAGNOSIS — H18.232: ICD-10-CM

## 2024-12-12 PROCEDURE — 92014 COMPRE OPH EXAM EST PT 1/>: CPT

## 2024-12-12 PROCEDURE — 92133 CPTRZD OPH DX IMG PST SGM ON: CPT

## 2024-12-12 RX ORDER — VALACYCLOVIR HYDROCHLORIDE 500 MG/1: 1 TABLET ORAL ONCE A DAY

## 2025-04-23 ENCOUNTER — FOLLOW UP (OUTPATIENT)
Age: 85
End: 2025-04-23

## 2025-04-23 DIAGNOSIS — H18.232: ICD-10-CM

## 2025-04-23 DIAGNOSIS — H40.1112: ICD-10-CM

## 2025-04-23 DIAGNOSIS — H40.1123: ICD-10-CM

## 2025-04-23 PROCEDURE — 99213 OFFICE O/P EST LOW 20 MIN: CPT

## 2025-04-23 PROCEDURE — 92083 EXTENDED VISUAL FIELD XM: CPT

## 2025-08-20 ENCOUNTER — COMPREHENSIVE EXAM (OUTPATIENT)
Age: 85
End: 2025-08-20

## 2025-08-20 DIAGNOSIS — H40.1131: ICD-10-CM

## 2025-08-20 DIAGNOSIS — H18.232: ICD-10-CM

## 2025-08-20 DIAGNOSIS — H40.1122: ICD-10-CM

## 2025-08-20 PROCEDURE — 92133 CPTRZD OPH DX IMG PST SGM ON: CPT

## 2025-08-20 PROCEDURE — 99213 OFFICE O/P EST LOW 20 MIN: CPT
